# Patient Record
Sex: MALE | URBAN - METROPOLITAN AREA
[De-identification: names, ages, dates, MRNs, and addresses within clinical notes are randomized per-mention and may not be internally consistent; named-entity substitution may affect disease eponyms.]

---

## 2018-09-27 ENCOUNTER — MEDICAL CORRESPONDENCE (OUTPATIENT)
Dept: HEALTH INFORMATION MANAGEMENT | Facility: CLINIC | Age: 28
End: 2018-09-27

## 2018-09-27 ENCOUNTER — TRANSFERRED RECORDS (OUTPATIENT)
Dept: HEALTH INFORMATION MANAGEMENT | Facility: CLINIC | Age: 28
End: 2018-09-27

## 2018-10-09 NOTE — TELEPHONE ENCOUNTER
FUTURE VISIT INFORMATION      FUTURE VISIT INFORMATION:    Date: 10/18/2018    Time: 9:00    [place] [facility] [place]ERRAL INFORMATION:    Referring provider:  Dr. Garcia    Referring providers clinic:  [facility]    Reason for visit/diagnosis   Nasal polyp     RECORDS REQUESTED FROM:       Clinic name Comments Records Status Imaging Status   [facility] OFFICE VISIT: 09/27/2018 INTERNAL NONE                                   RECORDS STATUS

## 2018-10-18 ENCOUNTER — PRE VISIT (OUTPATIENT)
Dept: OTOLARYNGOLOGY | Facility: CLINIC | Age: 28
End: 2018-10-18

## 2018-10-18 ENCOUNTER — OFFICE VISIT (OUTPATIENT)
Dept: OTOLARYNGOLOGY | Facility: CLINIC | Age: 28
End: 2018-10-18
Payer: COMMERCIAL

## 2018-10-18 DIAGNOSIS — J34.89 NASAL SORE: Primary | ICD-10-CM

## 2018-10-18 RX ORDER — MUPIROCIN 20 MG/G
OINTMENT TOPICAL
Qty: 22 G | Refills: 3 | Status: SHIPPED | OUTPATIENT
Start: 2018-10-18 | End: 2018-10-28

## 2018-10-18 RX ORDER — ESCITALOPRAM OXALATE 20 MG/1
TABLET ORAL
COMMUNITY
Start: 2018-06-06

## 2018-10-18 NOTE — LETTER
10/18/2018       RE: Raad Espino  [address]  Room [facility] [address]     Dear Colleague,    Thank you for referring your patient, Raad Espino, to the [facility] EAR NOSE AND THROAT at [facility]. Please see a copy of my visit note below.    Otolaryngology Adult Consultation    Patient: Raad Espino  : 1990      ID:  I was requested by Dr. Garcia to see the patient for consultation on nose problem    HPI:  Raad Espino is a 27 year old male seen today in the Otolaryngology Clinic for sensation of nasal foreign body. Patient reports that he has had this sensation in his right, upper nare for over a year. He denies any inciting event, though he may have had some nasal congestion when he first noticed it. He does admit frequent picking and blowing of his nose - this has caused frequent epistaxis He has not tried anything to resolve the sensation, including nasal sprays. He feels that the sensation is constantly present, although if he is distracted he does not noticed it as much.      Medications:  Current Outpatient Rx   Medication Sig Dispense Refill     escitalopram (LEXAPRO) 20 MG tablet        mupirocin (BACTROBAN) 2 % ointment Apply a thin amount to right nasal septum 2 times a day for 2 months 22 g 3       Allergies: Review of patient's allergies indicates no known allergies.     PMH:  No past medical history on file.    PSH:  No past surgical history on file.    FH:  No family history on file.     SH:  Social History   Substance Use Topics     Smoking status: Not on file     Smokeless tobacco: Not on file     Alcohol use Not on file       Review of Systems  No flowsheet data found.    Physical Exam:    GEN:  The patient is alert, oriented and in no acute distress.  HEAD:  Head, face scalp is grossly normal.  EARS:  Ears demonstrate normal canals, auricles and tympanic membranes.  NOSE:  External nose is straight, skin is normal.                 Intranasal exam (anterior rhinoscopy) reveals normal moist mucosa, turbinate                  tissue without edema, erythema or crusting. Right septum with large ulceration.   ORAL:  Oral cavity shows healthy mucosa with out ulceration, masses or other lesions                involving the tongue, palate, buccal mucosa, floor of mouth or gingiva.                 NECK:   Neck is without adenopathy, thyroid or salivary gland masses.  PUL: normal work of breathing; no stridor  CV: RRR; no peripheral edema  M/S: normal muscle tone     Nasal endoscopy done to examine the posterior nasal passages for pathology.  After application of topical anesthetic/decongestant solution the 0 degree rigid endoscope was passed into each nasal cavity separately.  Findings are as follows:   Right septum shows significant ulceration.  Left septum is dry.  Normal tissue and structures in posterior and superior portion of the nose bilaterlaterally.  No foreign body.          Assessment/Plan:  Patient presents with nasal foreign body sensation. Upon inspection of the right nasal passage, no foreign body or polyp is present. He does have a large ulceration to the right septum. I suspect that he initially had some dryness and when he continuously picked at his nose, an ulceration was formed. This is now likely the cause of foreign body sensation. I urged patient to refrain from picking at the area. Additionally, would like patient to thinly coat the right nostril with mupirocin twice daily for 2 months. He should use a saline nasal spray twice daily or as needed in order to moisten bilateral nostrils. I would like patient to return in 1 month to reassess.     I spent a total of 35 minutes face-to-face with Raad Espino during today's office visit.  Over 50% of this time was spent counseling the patient on and/or coordinating care as documented in my assessment and plan.\          Again, thank you for allowing me to participate in  the care of your patient.      Sincerely,    Yi Ellison MD

## 2018-10-18 NOTE — PROGRESS NOTES
Otolaryngology Adult Consultation    Patient: Raad Espino  : 1990      ID:  I was requested by Dr. Garcia to see the patient for consultation on nose problem    HPI:  Raad Espino is a 27 year old male seen today in the Otolaryngology Clinic for sensation of nasal foreign body. Patient reports that he has had this sensation in his right, upper nare for over a year. He denies any inciting event, though he may have had some nasal congestion when he first noticed it. He does admit frequent picking and blowing of his nose - this has caused frequent epistaxis He has not tried anything to resolve the sensation, including nasal sprays. He feels that the sensation is constantly present, although if he is distracted he does not noticed it as much.      Medications:  Current Outpatient Rx   Medication Sig Dispense Refill     escitalopram (LEXAPRO) 20 MG tablet        mupirocin (BACTROBAN) 2 % ointment Apply a thin amount to right nasal septum 2 times a day for 2 months 22 g 3       Allergies: Review of patient's allergies indicates no known allergies.     PMH:  No past medical history on file.    PSH:  No past surgical history on file.    FH:  No family history on file.     SH:  Social History   Substance Use Topics     Smoking status: Not on file     Smokeless tobacco: Not on file     Alcohol use Not on file       Review of Systems  No flowsheet data found.    Physical Exam:    GEN:  The patient is alert, oriented and in no acute distress.  HEAD:  Head, face scalp is grossly normal.  EARS:  Ears demonstrate normal canals, auricles and tympanic membranes.  NOSE:  External nose is straight, skin is normal.                Intranasal exam (anterior rhinoscopy) reveals normal moist mucosa, turbinate                  tissue without edema, erythema or crusting. Right septum with large ulceration.   ORAL:  Oral cavity shows healthy mucosa with out ulceration, masses or other lesions                involving  the tongue, palate, buccal mucosa, floor of mouth or gingiva.                 NECK:   Neck is without adenopathy, thyroid or salivary gland masses.  PUL: normal work of breathing; no stridor  CV: RRR; no peripheral edema  M/S: normal muscle tone     Nasal endoscopy done to examine the posterior nasal passages for pathology.  After application of topical anesthetic/decongestant solution the 0 degree rigid endoscope was passed into each nasal cavity separately.  Findings are as follows:   Right septum shows significant ulceration.  Left septum is dry.  Normal tissue and structures in posterior and superior portion of the nose bilaterlaterally.  No foreign body.          Assessment/Plan:  Patient presents with nasal foreign body sensation. Upon inspection of the right nasal passage, no foreign body or polyp is present. He does have a large ulceration to the right septum. I suspect that he initially had some dryness and when he continuously picked at his nose, an ulceration was formed. This is now likely the cause of foreign body sensation. I urged patient to refrain from picking at the area. Additionally, would like patient to thinly coat the right nostril with mupirocin twice daily for 2 months. He should use a saline nasal spray twice daily or as needed in order to moisten bilateral nostrils. I would like patient to return in 1 month to reassess.     I spent a total of 35 minutes face-to-face with Raad Espino during today's office visit.  Over 50% of this time was spent counseling the patient on and/or coordinating care as documented in my assessment and plan.\

## 2018-10-18 NOTE — NURSING NOTE
Chief Complaint   Patient presents with     Consult     possible nasal polyp     There were no vitals taken for this visit.    Tae Smith

## 2018-10-18 NOTE — MR AVS SNAPSHOT
After Visit Summary   10/18/2018    Raad Espino    MRN: [id number]           Patient Information     Date Of Birth          1990        Visit Information        Provider Department      10/18/2018 9:00 AM Yi Ellison MD [facility] Ear Nose and Throat        Today's Diagnoses     Nasal sore    -  1      Care Instructions    Use Nasal saline spray (eg Ocean Spray): 2 sprays to the left side of nose, twice a day for 1 month.              Follow-ups after your visit        Your next 10 appointments already scheduled     2018  1:45 PM CST   (Arrive by 1:30 PM)   RETURN NOSE with MD JOSS Dickens [facility] Ear Nose and Throat ([facility])    [address] [address] [phone]              Who to contact     Please call your clinic at [phone] to:    Ask questions about your health    Make or cancel appointments    Discuss your medicines    Learn about your test results    Speak to your doctor            Additional Information About Your Visit        MyChart Information     PushSpring is an electronic gateway that provides easy, online access to your medical records. With PushSpring, you can request a clinic appointment, read your test results, renew a prescription or communicate with your care team.     To sign up for PushSpring visit the website at [email or web address]   You will be asked to enter the access code listed below, as well as some personal information. Please follow the directions to create your username and password.     Your access code is: [email or web address]  Expires: 2018 11:21 AM     Your access code will  in 90 days. If you need help or a new code, please contact your [facility] or call [phone] for assistance.        Care EveryWhere ID     This is your Care EveryWhere ID. This could be used by other organizations to access your [place]  medical records  [id number]         Blood Pressure from Last 3 Encounters:   No data found for BP    Weight from Last 3 Encounters:   No data found for Wt              Today, you had the following     No orders found for display         Today's Medication Changes          These changes are accurate as of 10/18/18 10:02 AM.  If you have any questions, ask your nurse or doctor.               Start taking these medicines.        Dose/Directions    mupirocin 2 % ointment   Commonly known as:  BACTROBAN   Used for:  Nasal sore   Started by:  Yi Ellison MD        Apply a thin amount to right nasal septum 2 times a day for 2 months   Quantity:  22 g   Refills:  3            Where to get your medicines      These medications were sent to [address] [place] [address] [address] [place], [place] [id number]    Hours:  TRANSPLANT PHONE NUMBER [phone] Phone:  [phone]     mupirocin 2 % ointment                Primary Care Provider Office Phone # Fax #    Frida Radha Donnelly [phone]       [address] [address]        Equal Access to Services     CHEL RUDD AH: Hadii aad ku hadasho Soomaali, waaxda luqadaha, qaybta kaalmada adeegyada, waxay idiin hayaan flor [place] . So [facility] [phone].    ATENCIÓN: Si habla español, tiene a jimenez disposición servicios gratjoseos de asistencia lingüística. Llame al [phone].    We comply with applicable federal civil rights laws and [place] laws. We do not discriminate on the basis of race, color, national origin, age, disability, sex, sexual orientation, or gender identity.            Thank you!     Thank you for choosing [facility] EAR NOSE AND THROAT  for your care. Our goal is always to provide you with excellent care. Hearing back from our patients is one way we can continue to improve our services. Please take a few minutes to  complete the written survey that you may receive in the mail after your visit with us. Thank you!             Your Updated Medication List - Protect others around you: Learn how to safely use, store and throw away your medicines at www.disposemymeds.org.          This list is accurate as of 10/18/18 10:02 AM.  Always use your most recent med list.                   Brand Name Dispense Instructions for use Diagnosis    escitalopram 20 MG tablet    LEXAPRO          mupirocin 2 % ointment    BACTROBAN    22 g    Apply a thin amount to right nasal septum 2 times a day for 2 months    Nasal sore

## 2018-10-18 NOTE — PATIENT INSTRUCTIONS
Use Nasal saline spray (eg Ocean Spray): 2 sprays to the left side of nose, twice a day for 1 month.

## 2018-11-13 ENCOUNTER — OFFICE VISIT (OUTPATIENT)
Dept: OTOLARYNGOLOGY | Facility: CLINIC | Age: 28
End: 2018-11-13
Payer: COMMERCIAL

## 2018-11-13 DIAGNOSIS — H61.23 BILATERAL IMPACTED CERUMEN: ICD-10-CM

## 2018-11-13 DIAGNOSIS — J34.89 NASAL SORE: Primary | ICD-10-CM

## 2018-11-13 NOTE — PROGRESS NOTES
CHIEF COMPLAINT:  Follow-up of right nasal septal ulceration and foreign body sensation.      HISTORY OF PRESENT ILLNESS:  The patient returns to clinic today for follow up of his nose.  He has been using the mupirocin as directed.  He feels that the foreign body sensation, while not completely gone, is significantly improved.  He has a feeling of less irritation in his nose overall.  He has not noticed any blood.  The patient also reports that he has been having some right ear discomfort, and he would like me to take a look at his ears today.      PHYSICAL EXAMINATION:   GENERAL:  No acute distress.   NOSE:  Examination of the left nasal septum shows much improved mucosa health.  It appears less dry and irritated.  On the right side, the nose is significantly less crusty than previously seen.  He still has a septal ulceration present, but it is much .  The ulceration at this point measures approximately 1 cm.   EARS:  The patient has a bilateral cerumen impaction present.      PROCEDURE:  Binocular microscopy with cerumen cleaning.      DESCRIPTION:  Using the binocular microscope, I visualized the right ear.  The patient has a 100% cerumen impaction present.  Using a #7 suction, I was able to suction away the cerumen.  Right tympanic membrane is intact.  I repeated the procedure in similar fashion on the left side.  The left tympanic membrane is intact.      ASSESSMENT AND PLAN:  The patient has had improvement in his right nasal septal ulceration.  It is still present, though, the tissue overall looks much healthier and .  I will have him continue to use the mupirocin for another month to get this ulceration to decrease in size.  The patient's ears felt better after the cleaning.     I spent a total of 15 minutes face-to-face with Raad Espino during today's office visit.  Over 50% of this time was spent counseling the patient on and/or coordinating care as documented in my assessment and  plan.

## 2018-11-13 NOTE — MR AVS SNAPSHOT
After Visit Summary   2018    Raad Espino    MRN: [id number]           Patient Information     Date Of Birth          1990        Visit Information        Provider Department      2018 1:45 PM Yi Ellison MD [facility] Ear Nose and Throat        Today's Diagnoses     Nasal sore    -  1    Bilateral impacted cerumen           Follow-ups after your visit        Your next 10 appointments already scheduled     Dec 11, 2018  1:45 PM CST   (Arrive by 1:30 PM)   RETURN NOSE with MD JOSS Dickens [facility] Ear Nose and Throat ([facility] [place])    [address] [address] [phone]              Who to contact     Please call your clinic at [phone] to:    Ask questions about your health    Make or cancel appointments    Discuss your medicines    Learn about your test results    Speak to your doctor            Additional Information About Your Visit        MyChart Information     Ynsectt is an electronic gateway that provides easy, online access to your medical records. With VYou, you can request a clinic appointment, read your test results, renew a prescription or communicate with your care team.     To sign up for Ynsectt visit the website at [email or web address]   You will be asked to enter the access code listed below, as well as some personal information. Please follow the directions to create your username and password.     Your access code is: [email or web address]  Expires: 2018 10:21 AM     Your access code will  in 90 days. If you need help or a new code, please contact your [facility] or call [phone] for assistance.        Care EveryWhere ID     This is your Care EveryWhere ID. This could be used by other organizations to access your [place] medical records  [id number]         Blood Pressure from Last 3 Encounters:   No data found for BP     Weight from Last 3 Encounters:   No data found for Wt              We Performed the Following     BINOCULAR MICROSCOPY        Primary Care Provider Office Phone # Fax #    Frida Donnelly [phone]       [address] [address]        Equal Access to Services     JARED RUDD : Hadii aad ku hadasho Soomaali, waaxda luqadaha, qaybta kaalmada adeegyada, waxay idiin hayaan adeeg callumkerryrachel lapavithra douglas. So [facility] [phone].    ATENCIÓN: Si habla español, tiene a jimenez disposición servicios gratuitos de asistencia lingüística. Llame al [phone].    We comply with applicable federal civil rights laws and [place] laws. We do not discriminate on the basis of race, color, national origin, age, disability, sex, sexual orientation, or gender identity.            Thank you!     Thank you for choosing [facility] EAR NOSE AND THROAT  for your care. Our goal is always to provide you with excellent care. Hearing back from our patients is one way we can continue to improve our services. Please take a few minutes to complete the written survey that you may receive in the mail after your visit with us. Thank you!             Your Updated Medication List - Protect others around you: Learn how to safely use, store and throw away your medicines at www.disposemymeds.org.          This list is accurate as of 11/13/18  2:23 PM.  Always use your most recent med list.                   Brand Name Dispense Instructions for use Diagnosis    escitalopram 20 MG tablet    LEXAPRO

## 2018-12-11 ENCOUNTER — OFFICE VISIT (OUTPATIENT)
Dept: OTOLARYNGOLOGY | Facility: CLINIC | Age: 28
End: 2018-12-11
Payer: COMMERCIAL

## 2018-12-11 DIAGNOSIS — J34.89 NASAL SORE: Primary | ICD-10-CM

## 2018-12-11 NOTE — LETTER
12/11/2018       RE: Raad Espino  [address] [place]  Room [facility] [address]     Dear Colleague,    Thank you for referring your patient, Raad Espino, to the [facility] EAR NOSE AND THROAT at [facility]. Please see a copy of my visit note below.    CC: nasal septal ulceration    HPI: Patient returns to clinic today for follow-up of his nasal septal ulceration.  He is continues to use the mupirocin as directed.  He does feel like the ulceration is much smaller.  He did have a cold about 2-3 weeks ago.    PE:  GEN: nad  NOSE: right side of the nasal septum shows significantly less crust and in fact looks pretty normal.  On the left side septal ulceration is still present but much .  Ulceration at this point measures about 7 mm    A/P:  Patient will continue to use the mupirocin.  I discussed with him that it might take 3-4 more months before this is completely healed.  However at this point I think that he does not need to schedule follow-up as he is doing quite well on his own.  If at any point he needs a recheck or has concerns he should come back and see us.    I spent a total of 10 minutes face-to-face with Raad Espino during today's office visit.  Over 50% of this time was spent counseling the patient on and/or coordinating care as documented in my assessment and plan.      Again, thank you for allowing me to participate in the care of your patient.      Sincerely,    Yi Ellison MD

## 2018-12-11 NOTE — PROGRESS NOTES
CC: nasal septal ulceration    HPI: Patient returns to clinic today for follow-up of his nasal septal ulceration.  He is continues to use the mupirocin as directed.  He does feel like the ulceration is much smaller.  He did have a cold about 2-3 weeks ago.    PE:  GEN: nad  NOSE: right side of the nasal septum shows significantly less crust and in fact looks pretty normal.  On the left side septal ulceration is still present but much .  Ulceration at this point measures about 7 mm    A/P:  Patient will continue to use the mupirocin.  I discussed with him that it might take 3-4 more months before this is completely healed.  However at this point I think that he does not need to schedule follow-up as he is doing quite well on his own.  If at any point he needs a recheck or has concerns he should come back and see us.    I spent a total of 10 minutes face-to-face with Raad Espino during today's office visit.  Over 50% of this time was spent counseling the patient on and/or coordinating care as documented in my assessment and plan.

## 2024-02-09 NOTE — LETTER
11/13/2018       RE: Raad Espino  [address]  Room [facility] [address]     Dear Colleague,    Thank you for referring your patient, Raad Espino, to the [facility] EAR NOSE AND THROAT at [facility]. Please see a copy of my visit note below.    CHIEF COMPLAINT:  Follow-up of right nasal septal ulceration and foreign body sensation.      HISTORY OF PRESENT ILLNESS:  The patient returns to clinic today for follow up of his nose.  He has been using the mupirocin as directed.  He feels that the foreign body sensation, while not completely gone, is significantly improved.  He has a feeling of less irritation in his nose overall.  He has not noticed any blood.  The patient also reports that he has been having some right ear discomfort, and he would like me to take a look at his ears today.      PHYSICAL EXAMINATION:   GENERAL:  No acute distress.   NOSE:  Examination of the left nasal septum shows much improved mucosa health.  It appears less dry and irritated.  On the right side, the nose is significantly less crusty than previously seen.  He still has a septal ulceration present, but it is much .  The ulceration at this point measures approximately 1 cm.   EARS:  The patient has a bilateral cerumen impaction present.      PROCEDURE:  Binocular microscopy with cerumen cleaning.      DESCRIPTION:  Using the binocular microscope, I visualized the right ear.  The patient has a 100% cerumen impaction present.  Using a #7 suction, I was able to suction away the cerumen.  Right tympanic membrane is intact.  I repeated the procedure in similar fashion on the left side.  The left tympanic membrane is intact.      ASSESSMENT AND PLAN:  The patient has had improvement in his right nasal septal ulceration.  It is still present, though, the tissue overall looks much healthier and .  I will have him continue to use the mupirocin for another month to get this  Patient left a message, returned his call to [phone] and left a message.   ulceration to decrease in size.  The patient's ears felt better after the cleaning.     I spent a total of 15 minutes face-to-face with Raad Espino during today's office visit.  Over 50% of this time was spent counseling the patient on and/or coordinating care as documented in my assessment and plan.      Again, thank you for allowing me to participate in the care of your patient.      Sincerely,    Yi Ellison MD